# Patient Record
Sex: MALE | Race: WHITE | Employment: UNEMPLOYED | URBAN - METROPOLITAN AREA
[De-identification: names, ages, dates, MRNs, and addresses within clinical notes are randomized per-mention and may not be internally consistent; named-entity substitution may affect disease eponyms.]

---

## 2021-03-17 ENCOUNTER — HOSPITAL ENCOUNTER (OUTPATIENT)
Dept: LAB | Age: 41
Discharge: HOME | End: 2021-03-17

## 2021-03-17 ENCOUNTER — OFFICE VISIT (OUTPATIENT)
Dept: HEMATOLOGY | Age: 41
End: 2021-03-17
Payer: MEDICAID

## 2021-03-17 VITALS
DIASTOLIC BLOOD PRESSURE: 86 MMHG | HEIGHT: 68 IN | WEIGHT: 188.13 LBS | HEART RATE: 76 BPM | SYSTOLIC BLOOD PRESSURE: 121 MMHG | BODY MASS INDEX: 28.51 KG/M2 | TEMPERATURE: 97 F | OXYGEN SATURATION: 98 %

## 2021-03-17 DIAGNOSIS — B18.2 CHRONIC HEPATITIS C WITHOUT HEPATIC COMA (HCC): Primary | ICD-10-CM

## 2021-03-17 PROBLEM — F32.A DEPRESSION: Status: ACTIVE | Noted: 2021-03-17

## 2021-03-17 PROBLEM — F19.11 INTRAVENOUS DRUG ABUSE IN REMISSION (HCC): Status: ACTIVE | Noted: 2021-03-17

## 2021-03-17 LAB — SENTARA SPECIMEN COL,SENBCF: NORMAL

## 2021-03-17 PROCEDURE — 99001 SPECIMEN HANDLING PT-LAB: CPT

## 2021-03-17 PROCEDURE — 99203 OFFICE O/P NEW LOW 30 MIN: CPT | Performed by: INTERNAL MEDICINE

## 2021-03-17 RX ORDER — ESCITALOPRAM OXALATE 10 MG/1
TABLET ORAL
COMMUNITY
Start: 2021-03-10 | End: 2021-04-28

## 2021-03-17 RX ORDER — NALTREXONE 380 MG
KIT INTRAMUSCULAR
COMMUNITY
Start: 2021-02-19 | End: 2021-04-28 | Stop reason: ALTCHOICE

## 2021-03-17 RX ORDER — GABAPENTIN 800 MG/1
TABLET ORAL
COMMUNITY
Start: 2021-03-01

## 2021-03-17 NOTE — Clinical Note
3/27/2021 Patient: Lindy Willard YOB: 1980 Date of Visit: 3/17/2021 [address]ena Vick  [address] [place] [address] Via Fax: [phone] Dear Jose Salvador MD, Thank you for referring Mr. Lindy Willard to [address]ala for evaluation. My notes for this consultation are attached. If you have questions, please do not hesitate to call me. I look forward to following your patient along with you. Sincerely, Hien Quinonez MD

## 2021-03-17 NOTE — PROGRESS NOTES
[address] [facility]      Hailey Nichols MD, Dalia Sacks, Viveca Arlington, MD, MPH      Mendez Machado, JACOB Rainey, [facility]     Sandy LANDERS Niels, [facility]   Tomás Raya P-CASI Cortes, [facility]       Karlee [facility] 136    at [address], [address] Shivam English  22.    [phone]    FAX: [address] [place] [address] Drive, [address], [address]    [phone]    FAX: [phone]       Patient Care Team:  Edinson Hernandez MD as PCP - General (Internal Medicine)      Problem List  Date Reviewed: 3/17/2021          Codes Class Noted    Chronic hepatitis C ([facility] ICD-10-CM: B18.2  ICD-9-CM: 070.54  3/17/2021        Intravenous drug abuse in [facility]) ICD-10-CM: F19.11  ICD-9-CM: 305.93  3/17/2021        Depression ICD-10-CM: F32.9  ICD-9-CM: 426  3/17/2021                The clinicians listed above have asked me to see Choco Mead in consultation regarding chronic HCV and its management. All medical records sent by the referring physicians were reviewed     The patient is a 47 y.o.  male who was found to have abnormalities in liver chemistries and subsequently after enrolling in drug treatment in 9/2020. Risk factors for acquiring HCV are IV drug use in 1996 - 2020. There was no history of acute icteric hepatitis at the time of these risk factors. Imaging of the liver was not performed. An assessment of liver fibrosis with biopsy or elastography has not been performed. The patient has never received treatment for chronic HCV. The patient has no symptoms which can be attributed to the liver disorder.     The patient is not currently experiencing the following symptoms of liver disease:  fatigue, pain in the right side over the liver, nausea, swelling of the abdomen, swelling of the lower extremities, hematemesis, hematochezia. The patient completes all daily activities without any functional limitations. ASSESSMENT AND PLAN:  Chronic HCV   Chronic HCV of unclear severity. Liver transaminases are elevated. ALP is normal.  Liver function is normal.  The platelet count is normal.      Will perform laboratory testing to monitor liver function and degree of liver injury. This included BMP, hepatic panel, CBC with platelet count,     Will perform and/or review results of HCV viral load, HCV genotype to define the specific treatment and duration of treatment that will be required. Will perform  serologic and virologic studies to assess for other causes of chronic liver disease. Will perform imaging of the liver with ultrasound. The need to perform an assessment of liver fibrosis was discussed with the patient. The Fibroscan can assess liver fibrosis and determine if a patient has advanced fibrosis or cirrhosis without the need for liver biopsy. This will be performed at the next office visit. Chronic HCV Treatment  The patient has not been treated for HCV. The patient has HCV genotype 1A. The patient should be treated with Harvoni (sofasbuvir and ledipasvir), Mavyret (glecaprevir and piprentasvir) or Epclusa (sofosbuvir and valpitasvir). The SVR/cure rate for is over 95%. Screening for Hepatocellular Carcinoma  HCC screening is not necessary if the patient has no evidence of cirrhosis. Treatment of other medical problems in patients with chronic liver disease  There are no contraindications for the patient to take most medications that are necessary for treatment of other medical issues. Counseling for alcohol in patients with chronic liver disease  The patient was counseled regarding alcohol consumption and the effect of alcohol on chronic liver disease.   The patient does not consume any significant amount of alcohol. Substance Use  The patient was counseled regarding the risk of overdose and death from using opioids and other narcotic drugs. Discussed the risk of becoming reinfected with HCV once they are cured if they resume IV drug use or inhaling drugs nasally. The patient has not used drugs since 2020. Vaccinations   Vaccination for viral hepatitis A is recommended since the patient has no serologic evidence of previous exposure or vaccination with immunity. Vaccination for viral hepatitis B is not needed. The patient has serologic evidence of prior exposure or vaccination with immunity. Routine vaccinations against other bacterial and viral agents can be performed as indicated. Annual flu vaccination should be administered if indicated. ALLERGIES  Not on File    MEDICATIONS  Current Outpatient Medications   Medication Sig    VivitroL 380 mg ER injection     gabapentin (NEURONTIN) 800 mg tablet     escitalopram oxalate (LEXAPRO) 10 mg tablet      No current facility-administered medications for this visit. SYSTEM REVIEW NOT RELATED TO LIVER DISEASE OR REVIEWED ABOVE:  Constitution systems: Negative for fever, chills, weight gain, weight loss. Eyes: Negative for visual changes. ENT: Negative for sore throat, painful swallowing. Respiratory: Negative for cough, hemoptysis, SOB. Cardiology: Negative for chest pain, palpitations. GI:  Negative for constipation or diarrhea. : Negative for urinary frequency, dysuria, hematuria, nocturia. Skin: Negative for rash. Hematology: Negative for easy bruising, blood clots. Musculo-skelatal: Negative for back pain, muscle pain, weakness. Neurologic: Negative for headaches, dizziness, vertigo, memory problems not related to HE. Psychology: Negative for anxiety, depression. FAMILY HISTORY:  The father Has/had the following following chronic disease(s): CAD, CABG, CVA.     The mother Has/had the following chronic disease(s): Alpha-GAL. There is no family history of liver disease. SOCIAL HISTORY:  The patient is . The spouse has not been tested for HCV   The patient has 3 children, 1 stepchildren,   The patient currently smokes 1 pack of tobacco daily. The patient has never consumed significant amounts of alcohol. The patient used to work construction. PHYSICAL EXAMINATION:  Visit Vitals  /86   Pulse 76   Temp 97 °F (36.1 °C) (Tympanic)   Ht 5' 8\" (1.727 m)   Wt 188 lb 2 oz (85.3 kg)   SpO2 98%   BMI 28.60 kg/m²     General: No acute distress. Eyes: Sclera anicteric. ENT: No oral lesions. Thyroid normal.  Nodes: No adenopathy. Skin: No spider angiomata. No jaundice. No palmar erythema. Respiratory: Lungs clear to auscultation. Cardiovascular: Regular heart rate. No murmurs. No JVD. Abdomen: Soft non-tender. Liver size normal to percussion/palpation. Spleen not palpable. No obvious ascites. Extremities: No edema. No muscle wasting. No gross arthritic changes. Neurologic: Alert and oriented. Cranial nerves grossly intact. No asterixis. LABORATORY STUDIES:  Liver [place] of [address] Units 3/17/2021   WBC 4.0 - 11.0 K/uL 6.4   ANC 1.8 - 7.7 K/uL 3.8   HGB 13.2 - 17.3 g/dL 17.0    - 440 K/uL 276   AST 10 - 37 U/L 91 (H)   ALT 5 - 40 U/L 227 (H)   Alk Phos 25 - 115 U/L 60   Bili, Total 0.2 - 1.2 mg/dL 0.6   Bili, Direct 0.0 - 0.3 mg/dL <0.2   Albumin 3.5 - 5.0 g/dL 4.8   BUN 6 - 22 mg/dL 15   Creat 0.5 - 1.2 mg/dL 0.9   Na 133 - 145 mmol/L 141   K 3.5 - 5.5 mmol/L 4.5   Cl 98 - 110 mmol/L 105   CO2 20 - 32 mmol/L 25   Glucose 70 - 99 mg/dL 94     SEROLOGIES:  Serologies Latest Ref Rng & Units 3/17/2021   Hep A Ab, Total Negative Negative   Hep B Surface Ag None Detec None Detected   Hep B Core Ab, Total None Detec None Detected   Hep B Surface Ab None Detec Detected (A)   Hep C Genotype  1A     3/2021.   HCV RNA Log 6.44 intU    LIVER HISTOLOGY:  Not available or performed    ENDOSCOPIC PROCEDURES:  Not available or performed    RADIOLOGY:  Not available or performed    OTHER TESTING:  Not available or performed    FOLLOW-UP:  All of the issues listed above in the Assessment and Plan were discussed with the patient. All questions were answered. The patient expressed a clear understanding of the above. [facility] 87 in 4 weeks for Fibroscan and to initiate HCV treatment.       Odalys Chong MD  [address]  [address] [facility], [address] [place] [address] Nasima Hudson, [address]  [address] [place] [address]

## 2021-03-18 LAB
A-G RATIO,AGRAT: 1.5 RATIO (ref 1.1–2.6)
ABSOLUTE LYMPHOCYTE COUNT, 10803: 1.5 K/UL (ref 1–4.8)
ALBUMIN SERPL-MCNC: 4.8 G/DL (ref 3.5–5)
ALP SERPL-CCNC: 60 U/L (ref 25–115)
ALT SERPL-CCNC: 227 U/L (ref 5–40)
ANION GAP SERPL CALC-SCNC: 11.3 MMOL/L (ref 3–15)
AST SERPL W P-5'-P-CCNC: 91 U/L (ref 10–37)
BASOPHILS # BLD: 0 K/UL (ref 0–0.2)
BASOPHILS NFR BLD: 1 % (ref 0–2)
BILIRUB SERPL-MCNC: 0.6 MG/DL (ref 0.2–1.2)
BILIRUBIN, DIRECT,CBIL: <0.2 MG/DL (ref 0–0.3)
BUN SERPL-MCNC: 15 MG/DL (ref 6–22)
CALCIUM SERPL-MCNC: 9.7 MG/DL (ref 8.4–10.5)
CHLORIDE SERPL-SCNC: 105 MMOL/L (ref 98–110)
CO2 SERPL-SCNC: 25 MMOL/L (ref 20–32)
CREAT SERPL-MCNC: 0.9 MG/DL (ref 0.5–1.2)
EOSINOPHIL # BLD: 0.2 K/UL (ref 0–0.5)
EOSINOPHIL NFR BLD: 3 % (ref 0–6)
ERYTHROCYTE [DISTWIDTH] IN BLOOD BY AUTOMATED COUNT: 14.4 % (ref 10–15.5)
GFRAA, 66117: >60
GFRNA, 66118: >60
GLOBULIN,GLOB: 3.3 G/DL (ref 2–4)
GLUCOSE SERPL-MCNC: 94 MG/DL (ref 70–99)
GRANULOCYTES,GRANS: 59 % (ref 40–75)
HBV SURFACE AB SER RIA-ACNC: DETECTED
HCT VFR BLD AUTO: 55.6 % (ref 36.6–51.9)
HCV PCR LOG10, 20021: 6.44 {LOG_IU}/ML
HEP B CORE AB, TOTAL, 006718: NORMAL
HEP B SURFACE AG SCRN, 006510: NORMAL
HEPATITIS C RNA-PCR, 550401: ABNORMAL IU/ML
HGB BLD-MCNC: 17 G/DL (ref 13.2–17.3)
LYMPHOCYTES, LYMLT: 24 % (ref 20–45)
MCH RBC QN AUTO: 28 PG (ref 26–34)
MCHC RBC AUTO-ENTMCNC: 31 G/DL (ref 31–36)
MCV RBC AUTO: 93 FL (ref 80–95)
MONOCYTES # BLD: 0.9 K/UL (ref 0.1–1)
MONOCYTES NFR BLD: 14 % (ref 3–12)
NEUTROPHILS # BLD AUTO: 3.8 K/UL (ref 1.8–7.7)
PLATELET # BLD AUTO: 276 K/UL (ref 140–440)
PMV BLD AUTO: 11.3 FL (ref 9–13)
POTASSIUM SERPL-SCNC: 4.5 MMOL/L (ref 3.5–5.5)
PROT SERPL-MCNC: 8.1 G/DL (ref 6.4–8.3)
RBC # BLD AUTO: 6 M/UL (ref 3.8–5.8)
SODIUM SERPL-SCNC: 141 MMOL/L (ref 133–145)
WBC # BLD AUTO: 6.4 K/UL (ref 4–11)

## 2021-03-19 LAB
HEP A AB, TOTAL, 006726: NEGATIVE
HEPATITIS C GENOTYPE, 551278: NORMAL

## 2021-03-22 ENCOUNTER — HOSPITAL ENCOUNTER (OUTPATIENT)
Dept: ULTRASOUND IMAGING | Age: 41
Discharge: HOME | End: 2021-03-22
Attending: INTERNAL MEDICINE
Payer: MEDICAID

## 2021-03-22 DIAGNOSIS — B18.2 CHRONIC HEPATITIS C WITHOUT HEPATIC COMA (HCC): ICD-10-CM

## 2021-03-22 PROCEDURE — 76705 ECHO EXAM OF ABDOMEN: CPT

## 2021-04-28 ENCOUNTER — OFFICE VISIT (OUTPATIENT)
Dept: HEMATOLOGY | Age: 41
End: 2021-04-28
Payer: MEDICAID

## 2021-04-28 VITALS
HEART RATE: 80 BPM | TEMPERATURE: 97.4 F | OXYGEN SATURATION: 98 % | WEIGHT: 185 LBS | RESPIRATION RATE: 17 BRPM | HEIGHT: 68 IN | SYSTOLIC BLOOD PRESSURE: 123 MMHG | BODY MASS INDEX: 28.04 KG/M2 | DIASTOLIC BLOOD PRESSURE: 88 MMHG

## 2021-04-28 DIAGNOSIS — B18.2 CHRONIC HEPATITIS C WITHOUT HEPATIC COMA (HCC): Primary | ICD-10-CM

## 2021-04-28 PROCEDURE — 91200 LIVER ELASTOGRAPHY: CPT | Performed by: NURSE PRACTITIONER

## 2021-04-28 PROCEDURE — 99214 OFFICE O/P EST MOD 30 MIN: CPT | Performed by: NURSE PRACTITIONER

## 2021-04-28 RX ORDER — GLECAPREVIR AND PIBRENTASVIR 40; 100 MG/1; MG/1
3 TABLET, FILM COATED ORAL DAILY
Qty: 84 TAB | Refills: 1 | Status: SHIPPED | OUTPATIENT
Start: 2021-04-28 | End: 2021-05-03 | Stop reason: SDUPTHER

## 2021-05-03 RX ORDER — GLECAPREVIR AND PIBRENTASVIR 40; 100 MG/1; MG/1
3 TABLET, FILM COATED ORAL DAILY
Qty: 84 TAB | Refills: 1 | Status: SHIPPED | OUTPATIENT
Start: 2021-05-03 | End: 2021-10-20

## 2021-06-30 ENCOUNTER — HOSPITAL ENCOUNTER (OUTPATIENT)
Dept: LAB | Age: 41
Discharge: HOME | End: 2021-06-30

## 2021-06-30 ENCOUNTER — OFFICE VISIT (OUTPATIENT)
Dept: HEMATOLOGY | Age: 41
End: 2021-06-30
Payer: MEDICAID

## 2021-06-30 VITALS
TEMPERATURE: 98.7 F | OXYGEN SATURATION: 98 % | BODY MASS INDEX: 26.83 KG/M2 | HEART RATE: 78 BPM | HEIGHT: 68 IN | DIASTOLIC BLOOD PRESSURE: 80 MMHG | SYSTOLIC BLOOD PRESSURE: 107 MMHG | RESPIRATION RATE: 16 BRPM | WEIGHT: 177 LBS

## 2021-06-30 DIAGNOSIS — B18.2 CHRONIC HEPATITIS C WITHOUT HEPATIC COMA (HCC): Primary | ICD-10-CM

## 2021-06-30 LAB — SENTARA SPECIMEN COL,SENBCF: NORMAL

## 2021-06-30 PROCEDURE — 99214 OFFICE O/P EST MOD 30 MIN: CPT | Performed by: NURSE PRACTITIONER

## 2021-06-30 PROCEDURE — 99001 SPECIMEN HANDLING PT-LAB: CPT

## 2021-06-30 NOTE — PROGRESS NOTES
Selestino Purple [address] [facility] [address]      Chapo Ribera MD, Teresa Sullivan, Dale Renteria MD, MPH      Karol Isaac, PA-CASI Hernández, [facility]-BC     Sandy Bowser, [facility]   Silvano Gresham, [facility]    Lisa Mckeon, [facility]       Karleejohnnie Young Donnie De Pina 136    at [address], [facility] Shivam English  22.    [phone]    FAX: [address] [place] [address], [address]    [phone]    FAX: [phone]       Patient Care Team:  Josef Chen MD as PCP - General (Internal Medicine)      Problem List  Date Reviewed: 6/30/2021        Codes Class Noted    Chronic hepatitis [facility] ([facility] ICD-10-CM: B18.2  ICD-9-CM: 070.54  3/17/2021        Intravenous drug abuse in remission [facility]) ICD-10-CM: F19.11  ICD-9-CM: 305.93  3/17/2021        Depression ICD-10-CM: F32.9  ICD-9-CM: 489  3/17/2021              Adryan Villela returns to the [place] [address] today for education and management of chronic hepatitis C. He is now on the last week of an 8 week treatment regime for HCV. He is being treated with Mavyret. This is the only time the HCV has ever been treated. The active problem list, all pertinent past medical history, medications, liver histology, endoscopic studies, radiologic findings and laboratory findings related to the liver disorder were reviewed with the patient. The patient is a 47 y.o.  male who was found to have abnormalities in liver chemistries and subsequently after enrolling in drug treatment in 9/2020. Risk factors for acquiring HCV are IV drug use in 1996 - 2020. There was no history of acute icteric hepatitis at the time of these risk factors. Imaging of the liver was performed with ultrasound in 03/2021.   This demonstrates a normal appearing liver. An assessment of liver fibrosis with fibroscan analysis was performed in 04/2021. Results of the scan indicate mild hepatic fibrosis as well as fatty liver. The suggested Metavir fibrosis score is F1. The patient has no symptoms which can be attributed to the liver disorder. The patient completes all daily activities without any functional limitations. The patient has not experienced fatigue, fevers, chills, shortness of breath, chest pain, pain in the right side over the liver, diffuse abdominal pain, nausea, vomiting, constipation, diarrhrea, dry eyes, dry mouth, arthralgias, myalgias, yellowing of the eyes or skin, itching, dark urine, problems concentrating, swelling of the abdomen, swelling of the lower extremities, hematemesis, or hematochezia. ASSESSMENT AND PLAN:  Chronic HCV   Chronic HCV with mild hepatic fibrosis. The most recent laboratory studies indicate that the liver transaminases are elevated, alkaline phosphatase is normal, tests of hepatic synthetic and metabolic function are normal, and the platelet count is normal.      Serologic evaluation was negative for all causes of chronic liver disease. The need to perform an assessment of liver fibrosis was discussed with the patient. The Fibroscan can assess liver fibrosis and determine if a patient has advanced fibrosis or cirrhosis without the need for liver biopsy. This was performed in 004/2021. Results of the scan indicate mild hepatic fibrosis with a suggested Metavir fibrosis score of F1. The scan also indicates some fatty liver. Chronic HCV Treatment  The patient has HCV genotype 1A. He began an 8 week antiviral regime utilizing Mavyret 7 weeks ago. He denies missing any doses of the medication. He has no treatment related complaints. Will assess for sustained virological response (SVR) in 13 weeks.       Screening for Hepatocellular Carcinoma  Recent ultrasound was unremarkable. AFP is WNL. Treatment of other medical problems in patients with chronic liver disease  There are no contraindications for the patient to take most medications that are necessary for treatment of other medical issues. Counseling for alcohol in patients with chronic liver disease  The patient was counseled regarding alcohol consumption and the effect of alcohol on chronic liver disease. The patient does not consume any significant amount of alcohol. Substance Use  The patient was counseled regarding the risk of overdose and death from using opioids and other narcotic drugs. Discussed the risk of becoming reinfected with HCV once they are cured if they resume IV drug use or inhaling drugs nasally. The patient has not used drugs since 2020. Vaccinations   Vaccination for viral hepatitis A is recommended since the patient has no serologic evidence of previous exposure or vaccination with immunity. Vaccination for viral hepatitis B is not needed. The patient has serologic evidence of prior exposure or vaccination with immunity. Routine vaccinations against other bacterial and viral agents can be performed as indicated. Annual flu vaccination should be administered if indicated. ALLERGIES  No Known Allergies    MEDICATIONS  Current Outpatient Medications   Medication Sig    glecaprevir-pibrentasvir (Mavyret) 100-40 mg tab Take 3 Tabs by mouth daily.  gabapentin (NEURONTIN) 800 mg tablet      No current facility-administered medications for this visit. SYSTEM REVIEW NOT RELATED TO LIVER DISEASE OR REVIEWED ABOVE:  Constitution systems: Negative for fever, chills, weight gain, weight loss. Eyes: Negative for visual changes. ENT: Negative for sore throat, painful swallowing. Respiratory: Negative for cough, hemoptysis, SOB. Cardiology: Negative for chest pain, palpitations. GI:  Negative for constipation or diarrhea.   : Negative for urinary frequency, dysuria, hematuria, nocturia. Skin: Negative for rash. Hematology: Negative for easy bruising, blood clots. Musculo-skelatal: Negative for back pain, muscle pain, weakness. Neurologic: Negative for headaches, dizziness, vertigo, memory problems not related to HE. Psychology: Negative for anxiety, depression. FAMILY HISTORY:  The father has the following following chronic disease(s): CAD, CABG, CVA. The mother has the following chronic disease(s): Alpha-GAL. There is no family history of liver disease. SOCIAL HISTORY:  The patient is . The spouse has not been tested for HCV   The patient has 3 children, 1 stepchildren,   The patient currently smokes 1 pack of tobacco daily. The patient has never consumed significant amounts of alcohol. The patient used to work construction. PHYSICAL EXAMINATION:  Visit Vitals  /80 (BP 1 Location: Left upper arm, BP Patient Position: Sitting, BP Cuff Size: Small adult)   Pulse 78   Temp 98.7 °F (37.1 °C)   Resp 16   Ht 5' 8\" (1.727 m)   Wt 177 lb (80.3 kg)   SpO2 98%   BMI 26.91 kg/m²     General: No acute distress. Eyes: Sclera anicteric. ENT: No oral lesions. Thyroid normal.  Nodes: No adenopathy. Skin: No spider angiomata. No jaundice. No palmar erythema. Respiratory: Lungs clear to auscultation. Cardiovascular: Regular heart rate. No murmurs. No JVD. Abdomen: Soft non-tender. Liver size normal to percussion/palpation. Spleen not palpable. No obvious ascites. Extremities: No edema. No muscle wasting. No gross arthritic changes. Neurologic: Alert and oriented. Cranial nerves grossly intact. No asterixis.     LABORATORY STUDIES:  Liver [place] of [address] [place] [address] & Units 3/17/2021   WBC 4.0 - 11.0 K/uL 6.4   ANC 1.8 - 7.7 K/uL 3.8   HGB 13.2 - 17.3 g/dL 17.0    - 440 K/uL 276   AST 10 - 37 U/L 91 (H)   ALT 5 - 40 U/L 227 (H)   Alk Phos 25 - 115 U/L 60   Bili, Total 0.2 - 1.2 mg/dL 0.6 Bili, Direct 0.0 - 0.3 mg/dL <0.2   Albumin 3.5 - 5.0 g/dL 4.8   BUN 6 - 22 mg/dL 15   Creat 0.5 - 1.2 mg/dL 0.9   Na 133 - 145 mmol/L 141   K 3.5 - 5.5 mmol/L 4.5   Cl 98 - 110 mmol/L 105   CO2 20 - 32 mmol/L 25   Glucose 70 - 99 mg/dL 94     SEROLOGIES:  Serologies Latest Ref Rng & Units 3/17/2021   Hep A Ab, Total Negative Negative   Hep B Surface Ag None Detec None Detected   Hep B Core Ab, Total None Detec None Detected   Hep B Surface Ab None Detec Detected (A)   Hep C Genotype  1A     3/2021. HCV RNA Log 6.44 intU    LIVER HISTOLOGY:  04/2021. [facility]. EkPa was 6.6. IQR/med 16%. . The results suggested a fibrosis level of F1. The CAP score suggests there is hepatic steatosis. ENDOSCOPIC PROCEDURES:  Not available or performed    RADIOLOGY:  03/2021. Ultrasound of the liver. No sonographic abnormality of the liver identified. No suspicious mass or  ascites. OTHER TESTING:  Not available or performed    FOLLOW-UP:  All of the issues listed above in the Assessment and Plan were discussed with the patient. All questions were answered. The patient expressed a clear understanding of the above. [address] in 13 weeks to assess for SVR/cure.       Negrita Roth, JUDEP-C  Liver [place] of [address], [address] [place] [address] Nasima Hudson, [address] [place] [address]   [phone]

## 2021-07-01 LAB
A-G RATIO,AGRAT: 1.8 RATIO (ref 1.1–2.6)
ABSOLUTE LYMPHOCYTE COUNT, 10803: 2.1 K/UL (ref 1–4.8)
ALBUMIN SERPL-MCNC: 4.6 G/DL (ref 3.5–5)
ALP SERPL-CCNC: 59 U/L (ref 25–115)
ALT SERPL-CCNC: 15 U/L (ref 5–40)
ANION GAP SERPL CALC-SCNC: 11 MMOL/L (ref 3–15)
AST SERPL W P-5'-P-CCNC: 15 U/L (ref 10–37)
BASOPHILS # BLD: 0.1 K/UL (ref 0–0.2)
BASOPHILS NFR BLD: 1 % (ref 0–2)
BILIRUB SERPL-MCNC: 0.5 MG/DL (ref 0.2–1.2)
BILIRUBIN, DIRECT,CBIL: <0.2 MG/DL (ref 0–0.3)
BUN SERPL-MCNC: 16 MG/DL (ref 6–22)
CALCIUM SERPL-MCNC: 10.1 MG/DL (ref 8.4–10.5)
CHLORIDE SERPL-SCNC: 106 MMOL/L (ref 98–110)
CO2 SERPL-SCNC: 25 MMOL/L (ref 20–32)
CREAT SERPL-MCNC: 0.9 MG/DL (ref 0.5–1.2)
EOSINOPHIL # BLD: 0.1 K/UL (ref 0–0.5)
EOSINOPHIL NFR BLD: 2 % (ref 0–6)
ERYTHROCYTE [DISTWIDTH] IN BLOOD BY AUTOMATED COUNT: 14.3 % (ref 10–15.5)
GFRAA, 66117: >60
GFRNA, 66118: >60
GLOBULIN,GLOB: 2.5 G/DL (ref 2–4)
GLUCOSE SERPL-MCNC: 96 MG/DL (ref 70–99)
GRANULOCYTES,GRANS: 48 % (ref 40–75)
HCT VFR BLD AUTO: 50 % (ref 36.6–51.9)
HGB BLD-MCNC: 16 G/DL (ref 13.2–17.3)
LYMPHOCYTES, LYMLT: 38 % (ref 20–45)
MCH RBC QN AUTO: 29 PG (ref 26–34)
MCHC RBC AUTO-ENTMCNC: 32 G/DL (ref 31–36)
MCV RBC AUTO: 89 FL (ref 80–95)
MONOCYTES # BLD: 0.6 K/UL (ref 0.1–1)
MONOCYTES NFR BLD: 12 % (ref 3–12)
NEUTROPHILS # BLD AUTO: 2.6 K/UL (ref 1.8–7.7)
PLATELET # BLD AUTO: 250 K/UL (ref 140–440)
PMV BLD AUTO: 11.7 FL (ref 9–13)
POTASSIUM SERPL-SCNC: 4.6 MMOL/L (ref 3.5–5.5)
PROT SERPL-MCNC: 7.1 G/DL (ref 6.4–8.3)
RBC # BLD AUTO: 5.61 M/UL (ref 3.8–5.8)
SODIUM SERPL-SCNC: 142 MMOL/L (ref 133–145)
WBC # BLD AUTO: 5.5 K/UL (ref 4–11)

## 2021-07-03 LAB — HEPATITIS C VIRUS RNA PCR TND: NORMAL IU/ML

## 2021-10-20 ENCOUNTER — OFFICE VISIT (OUTPATIENT)
Dept: HEMATOLOGY | Age: 41
End: 2021-10-20
Payer: MEDICAID

## 2021-10-20 ENCOUNTER — HOSPITAL ENCOUNTER (OUTPATIENT)
Dept: LAB | Age: 41
Discharge: HOME | End: 2021-10-20

## 2021-10-20 VITALS
OXYGEN SATURATION: 98 % | RESPIRATION RATE: 24 BRPM | BODY MASS INDEX: 27.28 KG/M2 | WEIGHT: 180 LBS | DIASTOLIC BLOOD PRESSURE: 88 MMHG | HEIGHT: 68 IN | HEART RATE: 96 BPM | TEMPERATURE: 97.8 F | SYSTOLIC BLOOD PRESSURE: 129 MMHG

## 2021-10-20 DIAGNOSIS — B18.2 CHRONIC HEPATITIS C WITHOUT HEPATIC COMA (HCC): Primary | ICD-10-CM

## 2021-10-20 LAB — SENTARA SPECIMEN COL,SENBCF: NORMAL

## 2021-10-20 PROCEDURE — 99214 OFFICE O/P EST MOD 30 MIN: CPT | Performed by: NURSE PRACTITIONER

## 2021-10-20 PROCEDURE — 99001 SPECIMEN HANDLING PT-LAB: CPT

## 2021-10-20 NOTE — PROGRESS NOTES
[address] [facility], [place], [address], [place] Curt Ocasio, Verenice Pike MD, MPH      Adam Bourgeois, JACOB Martines, [facility]     Sandy Bowser, [facility]   Shahid Zachary, [facility]    Osmin Vargas, [facility]       Karlee Young Donnie De Pina 136    at [address], [address] Shivam English  22.    [phone]    FAX: [address] [place] [address], [address]    [phone]    FAX: [phone]       Patient Care Team:  Jonatan Chavez MD as PCP - General (Internal Medicine)      Problem List  Date Reviewed: 10/20/2021        Codes Class Noted    Chronic hepatitis C ([facility] ICD-10-CM: B18.2  ICD-9-CM: 070.54  3/17/2021        Intravenous drug abuse in remission [facility]) ICD-10-CM: F19.11  ICD-9-CM: 305.93  3/17/2021        Depression ICD-10-CM: I36. A  ICD-9-CM: 560  3/17/2021              Herbert Pantoja returns to the [place] [address] today for education and management of chronic hepatitis C. He completed an 8 week treatment regime for HCV in 08/2021. He was treated with Mavyret. This is the only time the HCV has ever been treated. The active problem list, all pertinent past medical history, medications, liver histology, endoscopic studies, radiologic findings and laboratory findings related to the liver disorder were reviewed with the patient. The patient is a 46 y.o.  male who was found to have abnormalities in liver chemistries and subsequently after enrolling in drug treatment in 9/2020. Risk factors for acquiring HCV are IV drug use in 1996 - 2020. There was no history of acute icteric hepatitis at the time of these risk factors. Imaging of the liver was performed with ultrasound in 03/2021.   This demonstrates a normal appearing liver. An assessment of liver fibrosis with fibroscan analysis was performed in 04/2021. Results of the scan indicate mild hepatic fibrosis as well as fatty liver. The suggested Metavir fibrosis score is F1. The patient has no symptoms which can be attributed to the liver disorder. The patient completes all daily activities without any functional limitations. The patient has not experienced fatigue, fevers, chills, shortness of breath, chest pain, pain in the right side over the liver, diffuse abdominal pain, nausea, vomiting, constipation, diarrhrea, dry eyes, dry mouth, arthralgias, myalgias, yellowing of the eyes or skin, itching, dark urine, problems concentrating, swelling of the abdomen, swelling of the lower extremities, hematemesis, or hematochezia. Since the last office appointment:  Finished 8 weeks of Mavyret the first week of 08/2021. He is now SVR 12/ cured. ASSESSMENT AND PLAN:  Chronic HCV   Chronic HCV with mild hepatic fibrosis. The most recent laboratory studies indicate that the liver transaminases are normal, alkaline phosphatase is normal, tests of hepatic synthetic and metabolic function are normal, and the platelet count is normal.    The is no detectable HCV RNA on today's labs. HCV has been cured. Serologic evaluation was negative for all causes of chronic liver disease. The need to perform an assessment of liver fibrosis was discussed with the patient. The Fibroscan can assess liver fibrosis and determine if a patient has advanced fibrosis or cirrhosis without the need for liver biopsy. This was performed in 004/2021. Results of the scan indicate mild hepatic fibrosis with a suggested Metavir fibrosis score of F1. The scan also indicates some fatty liver. Chronic HCV Treatment  The patient has HCV genotype 1A. He completed an 8 week antiviral regime utilizing Alanis Dunaway in 08/2021.   We are assessing for SVR/cure with today's labs.  He denies missing any doses of the medication. He has no treatment related complaints. ADDENDUM:  No detectable HCV RNA on today's labs. HCV has been eradicated and the patient is cured of the HCV infection. Screening for Hepatocellular Carcinoma  Recent ultrasound was unremarkable. AFP is WNL. Treatment of other medical problems in patients with chronic liver disease  There are no contraindications for the patient to take most medications that are necessary for treatment of other medical issues. Counseling for alcohol in patients with chronic liver disease  The patient was counseled regarding alcohol consumption and the effect of alcohol on chronic liver disease. The patient does not consume any significant amount of alcohol. Substance Use  The patient was counseled regarding the risk of overdose and death from using opioids and other narcotic drugs. Discussed the risk of becoming reinfected with HCV once they are cured if they resume IV drug use or inhaling drugs nasally. The patient has not used drugs since 2020. Vaccinations   Vaccination for viral hepatitis A is recommended since the patient has no serologic evidence of previous exposure or vaccination with immunity. Vaccination for viral hepatitis B is not needed. The patient has serologic evidence of prior exposure or vaccination with immunity. Routine vaccinations against other bacterial and viral agents can be performed as indicated. Annual flu vaccination should be administered if indicated. ALLERGIES  No Known Allergies    MEDICATIONS  Current Outpatient Medications   Medication Sig    gabapentin (NEURONTIN) 800 mg tablet      No current facility-administered medications for this visit. SYSTEM REVIEW NOT RELATED TO LIVER DISEASE OR REVIEWED ABOVE:  Constitution systems: Negative for fever, chills, weight gain, weight loss. Eyes: Negative for visual changes.   ENT: Negative for sore throat, painful swallowing. Respiratory: Negative for cough, hemoptysis, SOB. Cardiology: Negative for chest pain, palpitations. GI:  Negative for constipation or diarrhea. : Negative for urinary frequency, dysuria, hematuria, nocturia. Skin: Negative for rash. Hematology: Negative for easy bruising, blood clots. Musculo-skelatal: Negative for back pain, muscle pain, weakness. Neurologic: Negative for headaches, dizziness, vertigo, memory problems not related to HE. Psychology: Negative for anxiety, depression. FAMILY HISTORY:  The father has the following following chronic disease(s): CAD, CABG, CVA. The mother has the following chronic disease(s): Alpha-GAL. There is no family history of liver disease. SOCIAL HISTORY:  The patient is . The spouse has not been tested for HCV   The patient has 3 children, 1 stepchildren,   The patient currently smokes 1 pack of tobacco daily. The patient has never consumed significant amounts of alcohol. The patient used to work construction. PHYSICAL EXAMINATION:  Visit Vitals  /88 (BP 1 Location: Left upper arm, BP Patient Position: Sitting, BP Cuff Size: Small adult)   Pulse 96   Temp 97.8 °F (36.6 °C)   Resp 24   Ht 5' 8\" (1.727 m)   Wt 180 lb (81.6 kg)   SpO2 98%   BMI 27.37 kg/m²     General: No acute distress. Eyes: Sclera anicteric. ENT: No oral lesions. Thyroid normal.  Nodes: No adenopathy. Skin: No spider angiomata. No jaundice. No palmar erythema. Respiratory: Lungs clear to auscultation. Cardiovascular: Regular heart rate. No murmurs. No JVD. Abdomen: Soft non-tender. Liver size normal to percussion/palpation. Spleen not palpable. No obvious ascites. Extremities: No edema. No muscle wasting. No gross arthritic changes. Neurologic: Alert and oriented. Cranial nerves grossly intact. No asterixis.     LABORATORY STUDIES:  [facility] [place] of [address] Units 6/30/2021 3/17/2021   WBC 4.0 - 11.0 K/uL 5.5 6.4   ANC 1.8 - 7.7 K/uL 2.6 3.8   HGB 13.2 - 17.3 g/dL 16.0 17.0    - 440 K/uL 250 276   AST 10 - 37 U/L 15 91 (H)   ALT 5 - 40 U/L 15 227 (H)   Alk Phos 25 - 115 U/L 59 60   Bili, Total 0.2 - 1.2 mg/dL 0.5 0.6   Bili, Direct 0.0 - 0.3 mg/dL <0.2 <0.2   Albumin 3.5 - 5.0 g/dL 4.6 4.8   BUN 6 - 22 mg/dL 16 15   Creat 0.5 - 1.2 mg/dL 0.9 0.9   Na 133 - 145 mmol/L 142 141   K 3.5 - 5.5 mmol/L 4.6 4.5   Cl 98 - 110 mmol/L 106 105   CO2 20 - 32 mmol/L 25 25   Glucose 70 - 99 mg/dL 96 94     SEROLOGIES:  Serologies Latest Ref Rng & Units 3/17/2021   Hep A Ab, Total Negative Negative   Hep B Surface Ag None Detec None Detected   Hep B Core Ab, Total None Detec None Detected   Hep B Surface Ab None Detec Detected (A)   Hep C Genotype  1A     3/2021. HCV RNA Log 6.44 intU    LIVER HISTOLOGY:  04/2021. [facility]. EkPa was 6.6. IQR/med 16%. . The results suggested a fibrosis level of F1. The CAP score suggests there is hepatic steatosis. ENDOSCOPIC PROCEDURES:  Not available or performed    RADIOLOGY:  03/2021. Ultrasound of the liver. No sonographic abnormality of the liver identified. No suspicious mass or  ascites. OTHER TESTING:  Not available or performed    FOLLOW-UP:  All of the issues listed above in the Assessment and Plan were discussed with the patient. All questions were answered. The patient expressed a clear understanding of the above. Follow-up [facility] on a PRN basis.       Iker Tai, FNP-C  Liver [place] of [address], [address] [place] [address] Maryan Levine, [address] [place] [address]   [phone]

## 2021-10-21 LAB — HEPATITIS C VIRUS RNA PCR TND: NORMAL IU/ML

## 2022-03-18 PROBLEM — F32.A DEPRESSION: Status: ACTIVE | Noted: 2021-03-17

## 2022-03-18 PROBLEM — F19.11 INTRAVENOUS DRUG ABUSE IN REMISSION (HCC): Status: ACTIVE | Noted: 2021-03-17

## 2022-03-18 PROBLEM — B18.2 CHRONIC HEPATITIS C (HCC): Status: ACTIVE | Noted: 2021-03-17

## 2024-08-20 ENCOUNTER — HOSPITAL ENCOUNTER (OUTPATIENT)
Facility: HOSPITAL | Age: 44
Setting detail: SPECIMEN
Discharge: HOME | End: 2024-08-23

## 2024-08-20 ENCOUNTER — OFFICE VISIT (OUTPATIENT)
Age: 44
End: 2024-08-20
Payer: MEDICAID

## 2024-08-20 VITALS
TEMPERATURE: 97.5 F | BODY MASS INDEX: 27.37 KG/M2 | OXYGEN SATURATION: 99 % | DIASTOLIC BLOOD PRESSURE: 87 MMHG | SYSTOLIC BLOOD PRESSURE: 123 MMHG | HEIGHT: 68 IN | HEART RATE: 81 BPM

## 2024-08-20 DIAGNOSIS — B18.2 CHRONIC HEPATITIS C WITHOUT HEPATIC COMA (HCC): Primary | ICD-10-CM

## 2024-08-20 DIAGNOSIS — B18.2 CHRONIC HEPATITIS C WITHOUT HEPATIC COMA (HCC): ICD-10-CM

## 2024-08-20 LAB — SENTARA SPECIMEN COLLECTION: NORMAL

## 2024-08-20 PROCEDURE — 99001 SPECIMEN HANDLING PT-LAB: CPT

## 2024-08-20 PROCEDURE — 99214 OFFICE O/P EST MOD 30 MIN: CPT | Performed by: NURSE PRACTITIONER

## 2024-08-20 RX ORDER — ALBUTEROL SULFATE 90 UG/1
2 AEROSOL, METERED RESPIRATORY (INHALATION) EVERY 4 HOURS PRN
COMMUNITY
Start: 2023-11-16

## 2024-08-20 RX ORDER — DICYCLOMINE HCL 20 MG
20 TABLET ORAL 4 TIMES DAILY PRN
COMMUNITY
Start: 2018-03-03 | End: 2024-08-20

## 2024-08-20 RX ORDER — CLINDAMYCIN HYDROCHLORIDE 300 MG/1
CAPSULE ORAL
COMMUNITY
Start: 2024-05-13 | End: 2024-08-20

## 2024-08-20 RX ORDER — BUPRENORPHINE HYDROCHLORIDE AND NALOXONE HYDROCHLORIDE DIHYDRATE 8; 2 MG/1; MG/1
16 TABLET SUBLINGUAL DAILY
COMMUNITY
End: 2024-08-20

## 2024-08-20 NOTE — PROGRESS NOTES
has HCV genotype 1A.  He completed an 8 week antiviral regime utilizing Mavyret in 08/2021.  He denies missing any doses of the medication.   He has no treatment related complaints.  The patient was cured of the HCV as evidenced by negative HCV PCR on labs from 10/20/2021.  If he has HCV virus today, he was re-infected while using IV drugs during his relapse in [address].       Screening for Hepatocellular Carcinoma  Recent ultrasound was unremarkable.  AFP is WNL.   Repeating an AFP-L3% today.     Treatment of other medical problems in patients with chronic liver disease  There are no contraindications for the patient to take most medications that are necessary for treatment of other medical issues.    Counseling for alcohol in patients with chronic liver disease  The patient was counseled regarding alcohol consumption and the effect of alcohol on chronic liver disease.  The patient does not consume any significant amount of alcohol.    Substance Use  The patient was counseled regarding the risk of overdose and death from using opioids and other narcotic drugs.  Discussed the risk of becoming reinfected with HCV once they are cured if they resume IV drug use or inhaling drugs nasally.    The patient has not used drugs since 2022.    Vaccinations   Vaccination for viral hepatitis A is recommended since the patient has no serologic evidence of previous exposure or vaccination with immunity.  Vaccination for viral hepatitis B is not needed.  The patient has serologic evidence of prior exposure or vaccination with immunity.  Routine vaccinations against other bacterial and viral agents can be performed as indicated.  Annual flu vaccination should be administered if indicated.    ALLERGIES  No Known Allergies    MEDICATIONS  Current Outpatient Medications   Medication Sig    gabapentin (NEURONTIN) 800 mg tablet      No current facility-administered medications for this visit.     SYSTEM REVIEW NOT RELATED TO LIVER DISEASE

## 2024-08-30 LAB
A/G RATIO: 1.9 RATIO (ref 1.1–2.6)
AFP (ALPHA FETOPROTEIN) L3%: NORMAL % (ref 0.5–9.9)
AFP: 1.9 NG/ML (ref 1.6–4.5)
ALBUMIN: 4.5 G/DL (ref 3.5–5)
ALP BLD-CCNC: 72 U/L (ref 25–115)
ALPHA 2-MACROGLOBULINS, QN: 138 MG/DL (ref 106–279)
ALT SERPL-CCNC: 10 U/L (ref 5–40)
ALT SERPL-CCNC: 12 U/L (ref 9–46)
ANION GAP SERPL CALCULATED.3IONS-SCNC: 11 MMOL/L (ref 3–15)
APOLIPOPROTEIN A-1: 137 MG/DL (ref 94–176)
AST SERPL-CCNC: 14 U/L (ref 10–37)
BASOPHILS ABSOLUTE: 0 K/UL (ref 0–0.2)
BASOPHILS RELATIVE PERCENT: 1 % (ref 0–2)
BILIRUB SERPL-MCNC: 0.3 MG/DL (ref 0.2–1.2)
BILIRUB SERPL-MCNC: 0.4 MG/DL (ref 0.2–1.2)
BILIRUBIN DIRECT: <0.2 MG/DL (ref 0–0.3)
BUN BLDV-MCNC: 16 MG/DL (ref 6–22)
CALCIUM SERPL-MCNC: 10.2 MG/DL (ref 8.4–10.5)
CHLORIDE BLD-SCNC: 106 MMOL/L (ref 98–110)
CO2: 26 MMOL/L (ref 20–32)
COMMENT: NORMAL
CREAT SERPL-MCNC: 0.9 MG/DL (ref 0.5–1.2)
EOSINOPHIL # BLD: 1 % (ref 0–6)
EOSINOPHILS ABSOLUTE: 0.1 K/UL (ref 0–0.5)
FIBROSIS SCORE (NASH): 0.05
FIBROSIS SCORING  (NASH): NORMAL
FIBROSIS STAGE (NASH): NORMAL
GFR, ESTIMATED: >60 ML/MIN/1.73 SQ.M.
GGT (NASH): 12 U/L (ref 3–95)
GLOBULIN: 2.4 G/DL (ref 2–4)
GLUCOSE: 74 MG/DL (ref 70–99)
HAPTOGLOBIN (NASH): 180 MG/DL (ref 43–212)
HCT VFR BLD CALC: 47.7 % (ref 36.6–51.9)
HEMOGLOBIN: 15.1 G/DL (ref 13.2–17.3)
HEPATITIS C GENOTYPE: NOT DETECTED
HEPATITIS C VIRUS RNA PCR TND: NORMAL IU/ML
INR BLD: 1 (ref 0.89–1.29)
LYMPHOCYTES # BLD: 26 % (ref 20–45)
LYMPHOCYTES ABSOLUTE: 1.7 K/UL (ref 1–4.8)
MCH RBC QN AUTO: 27 PG (ref 26–34)
MCHC RBC AUTO-ENTMCNC: 32 G/DL (ref 31–36)
MCV RBC AUTO: 86 FL (ref 80–95)
MONOCYTES ABSOLUTE: 0.6 K/UL (ref 0.1–1)
MONOCYTES: 9 % (ref 3–12)
NECROINFLAMMAT ACTIVITY GRADE: NORMAL
NECROINFLAMMAT ACTIVITY SCORE: 0.02
NECROINFLAMMAT ACTIVITY SCORE: NORMAL
NEUTROPHILS ABSOLUTE: 4.1 K/UL (ref 1.8–7.7)
NEUTROPHILS: 63 % (ref 40–75)
PDW BLD-RTO: 14.6 % (ref 10–15.5)
PLATELET # BLD: 276 K/UL (ref 140–440)
PMV BLD AUTO: 11.5 FL (ref 9–13)
POTASSIUM SERPL-SCNC: 4.9 MMOL/L (ref 3.5–5.5)
PROTHROMBIN TIME: 10.7 SEC (ref 9–13)
RBC # BLD: 5.55 M/UL (ref 3.8–5.8)
REFERENCES: NORMAL
SODIUM BLD-SCNC: 143 MMOL/L (ref 133–145)
TOTAL PROTEIN: 6.9 G/DL (ref 6.4–8.3)
WBC # BLD: 6.5 K/UL (ref 4–11)

## 2024-11-18 ENCOUNTER — TELEPHONE (OUTPATIENT)
Age: 44
End: 2024-11-18

## 2024-11-19 ENCOUNTER — TELEPHONE (OUTPATIENT)
Age: 44
End: 2024-11-19

## 2024-11-19 NOTE — TELEPHONE ENCOUNTER
Pt called the office to cancel his Fibroscan appt and to inform us that his insurance is no longer active. He stated that he will call and reschedule the appt when he get his insurance straight.